# Patient Record
Sex: MALE | Race: WHITE | NOT HISPANIC OR LATINO | Employment: UNEMPLOYED | URBAN - METROPOLITAN AREA
[De-identification: names, ages, dates, MRNs, and addresses within clinical notes are randomized per-mention and may not be internally consistent; named-entity substitution may affect disease eponyms.]

---

## 2020-11-25 ENCOUNTER — COMMUNICATION - HEALTHEAST (OUTPATIENT)
Dept: SCHEDULING | Facility: CLINIC | Age: 40
End: 2020-11-25

## 2020-11-30 ENCOUNTER — OFFICE VISIT - HEALTHEAST (OUTPATIENT)
Dept: FAMILY MEDICINE | Facility: CLINIC | Age: 40
End: 2020-11-30

## 2020-11-30 DIAGNOSIS — I10 HYPERTENSION, ESSENTIAL: ICD-10-CM

## 2020-11-30 LAB
ALBUMIN SERPL-MCNC: 4.5 G/DL (ref 3.5–5)
ALP SERPL-CCNC: 78 U/L (ref 45–120)
ALT SERPL W P-5'-P-CCNC: 26 U/L (ref 0–45)
ANION GAP SERPL CALCULATED.3IONS-SCNC: 10 MMOL/L (ref 5–18)
AST SERPL W P-5'-P-CCNC: 24 U/L (ref 0–40)
BASOPHILS # BLD AUTO: 0 THOU/UL (ref 0–0.2)
BASOPHILS NFR BLD AUTO: 1 % (ref 0–2)
BILIRUB SERPL-MCNC: 1.2 MG/DL (ref 0–1)
BUN SERPL-MCNC: 10 MG/DL (ref 8–22)
CALCIUM SERPL-MCNC: 9.5 MG/DL (ref 8.5–10.5)
CHLORIDE BLD-SCNC: 103 MMOL/L (ref 98–107)
CO2 SERPL-SCNC: 27 MMOL/L (ref 22–31)
CREAT SERPL-MCNC: 0.96 MG/DL (ref 0.7–1.3)
EOSINOPHIL # BLD AUTO: 0.2 THOU/UL (ref 0–0.4)
EOSINOPHIL NFR BLD AUTO: 4 % (ref 0–6)
ERYTHROCYTE [DISTWIDTH] IN BLOOD BY AUTOMATED COUNT: 12.6 % (ref 11–14.5)
GFR SERPL CREATININE-BSD FRML MDRD: >60 ML/MIN/1.73M2
GLUCOSE BLD-MCNC: 98 MG/DL (ref 70–125)
HCT VFR BLD AUTO: 44.6 % (ref 40–54)
HGB BLD-MCNC: 15 G/DL (ref 14–18)
LYMPHOCYTES # BLD AUTO: 2.1 THOU/UL (ref 0.8–4.4)
LYMPHOCYTES NFR BLD AUTO: 34 % (ref 20–40)
MCH RBC QN AUTO: 30.1 PG (ref 27–34)
MCHC RBC AUTO-ENTMCNC: 33.7 G/DL (ref 32–36)
MCV RBC AUTO: 89 FL (ref 80–100)
MONOCYTES # BLD AUTO: 0.4 THOU/UL (ref 0–0.9)
MONOCYTES NFR BLD AUTO: 6 % (ref 2–10)
NEUTROPHILS # BLD AUTO: 3.5 THOU/UL (ref 2–7.7)
NEUTROPHILS NFR BLD AUTO: 56 % (ref 50–70)
PLATELET # BLD AUTO: 223 THOU/UL (ref 140–440)
PMV BLD AUTO: 8.5 FL (ref 7–10)
POTASSIUM BLD-SCNC: 4.8 MMOL/L (ref 3.5–5)
PROT SERPL-MCNC: 7.2 G/DL (ref 6–8)
RBC # BLD AUTO: 5 MILL/UL (ref 4.4–6.2)
SODIUM SERPL-SCNC: 140 MMOL/L (ref 136–145)
TSH SERPL DL<=0.005 MIU/L-ACNC: 1.49 UIU/ML (ref 0.3–5)
WBC: 6.2 THOU/UL (ref 4–11)

## 2020-11-30 ASSESSMENT — MIFFLIN-ST. JEOR: SCORE: 1732.74

## 2020-12-01 ENCOUNTER — COMMUNICATION - HEALTHEAST (OUTPATIENT)
Dept: FAMILY MEDICINE | Facility: CLINIC | Age: 40
End: 2020-12-01

## 2020-12-22 ENCOUNTER — COMMUNICATION - HEALTHEAST (OUTPATIENT)
Dept: FAMILY MEDICINE | Facility: CLINIC | Age: 40
End: 2020-12-22

## 2020-12-22 DIAGNOSIS — I10 HYPERTENSION, ESSENTIAL: ICD-10-CM

## 2021-01-05 ENCOUNTER — OFFICE VISIT - HEALTHEAST (OUTPATIENT)
Dept: FAMILY MEDICINE | Facility: CLINIC | Age: 41
End: 2021-01-05

## 2021-01-05 DIAGNOSIS — L74.0 SWEAT RASH: ICD-10-CM

## 2021-01-05 DIAGNOSIS — F70 MILD INTELLECTUAL DISABILITIES: ICD-10-CM

## 2021-01-05 DIAGNOSIS — L29.9 ITCHY SKIN: ICD-10-CM

## 2021-01-05 DIAGNOSIS — I10 HYPERTENSION, ESSENTIAL: ICD-10-CM

## 2021-01-05 DIAGNOSIS — F31.9 BIPOLAR I DISORDER (H): ICD-10-CM

## 2021-01-05 DIAGNOSIS — J30.2 SEASONAL ALLERGIC RHINITIS, UNSPECIFIED TRIGGER: ICD-10-CM

## 2021-01-05 ASSESSMENT — MIFFLIN-ST. JEOR: SCORE: 1752.24

## 2021-02-08 ENCOUNTER — COMMUNICATION - HEALTHEAST (OUTPATIENT)
Dept: FAMILY MEDICINE | Facility: CLINIC | Age: 41
End: 2021-02-08

## 2021-02-08 DIAGNOSIS — J30.2 SEASONAL ALLERGIC RHINITIS, UNSPECIFIED TRIGGER: ICD-10-CM

## 2021-02-08 RX ORDER — ALBUTEROL SULFATE 90 UG/1
2 AEROSOL, METERED RESPIRATORY (INHALATION) EVERY 6 HOURS PRN
Qty: 1 EACH | Refills: 2 | Status: SHIPPED | OUTPATIENT
Start: 2021-02-08

## 2021-02-25 ENCOUNTER — COMMUNICATION - HEALTHEAST (OUTPATIENT)
Dept: ADMINISTRATIVE | Facility: CLINIC | Age: 41
End: 2021-02-25

## 2021-02-25 DIAGNOSIS — I10 HYPERTENSION, ESSENTIAL: ICD-10-CM

## 2021-02-26 RX ORDER — LISINOPRIL 5 MG/1
5 TABLET ORAL DAILY
Qty: 90 TABLET | Refills: 3 | Status: SHIPPED | OUTPATIENT
Start: 2021-02-26 | End: 2022-05-13

## 2021-06-05 VITALS
HEART RATE: 91 BPM | DIASTOLIC BLOOD PRESSURE: 98 MMHG | HEIGHT: 70 IN | BODY MASS INDEX: 25.61 KG/M2 | TEMPERATURE: 97.8 F | WEIGHT: 178.9 LBS | SYSTOLIC BLOOD PRESSURE: 130 MMHG | OXYGEN SATURATION: 100 %

## 2021-06-05 VITALS
HEIGHT: 71 IN | BODY MASS INDEX: 25.16 KG/M2 | WEIGHT: 179.7 LBS | DIASTOLIC BLOOD PRESSURE: 82 MMHG | HEART RATE: 84 BPM | SYSTOLIC BLOOD PRESSURE: 128 MMHG | TEMPERATURE: 98.2 F

## 2021-06-13 NOTE — PROGRESS NOTES
ASSESSMENT:  1. Hypertension, essential    His blood pressure is not controlled right now, so we will start him on some lisinopril 5 mg a day.  I asked him to take this every day and come back in about 4 weeks to let us know how he is doing with his blood pressures.  If he can, it would be beneficial to have him check it at home or when he is out in the stores.    I think this is probably what is giving him these feelings of being overly warm and flushed with his high blood pressure, but we can see how that goes when we lower his blood pressure with the medication that we started today, but as well we will also check some labs as listed out below and follow-up with him when the results of the become available.      - lisinopriL (PRINIVIL,ZESTRIL) 5 MG tablet; Take 1 tablet (5 mg total) by mouth daily.  Dispense: 30 tablet; Refill: 2  - Thyroid Stimulating Hormone (TSH)  - Comprehensive Metabolic Panel  - HM1(CBC and Differential)          PLAN:  There are no Patient Instructions on file for this visit.    Orders Placed This Encounter   Procedures     Influenza, Seasonal Quad, PF =/> 6months     Thyroid Stimulating Hormone (TSH)     Comprehensive Metabolic Panel     HM1 (CBC with Diff)     Medications Discontinued During This Encounter   Medication Reason     oxyCODONE-acetaminophen (PERCOCET) 5-325 mg per tablet Therapy completed       No follow-ups on file.    CHIEF COMPLAINT:  Chief Complaint   Patient presents with     warm     pt gets really hot, no fever, currently walking around in shorts, no other symptoms, feels perfectly fine, skin does get red when hot and goes away when he cools down, wife thinks it may be an allergic reaction, has been going on for several months, pt is bad at communicating and requesting wife to join him for visit       HISTORY OF PRESENT ILLNESS:  Shashi is a 40 y.o. male presenting to the clinic today for issues related to feelings of being warm all the time and of some flushing that  "he gets.He is a new patient to our clinic.  We reviewed his history he does have a history of essential hypertension but is currently not on any medications for this.  He does not know if his blood pressures have been elevated as he does not check them at home.  He just feels warm a lot of the time and walks around with shorts on when the radiologist is addressed a little more warmly.  He does not have a lot of sweating from this.  It seems that his skin gets a little red as well.  This is been going on for several months.  The patient has some mild little actual disabilities and it is difficult for him to communicate exactly what is going on with his family.        REVIEW OF SYSTEMS:     All other systems are negative.    PFSH:    Reviewed    Patient is new patient to the clinic. Please see past medical history, surgical history, social history and family history, all of which were completed in their entirety today.     TOBACCO USE:  Social History     Tobacco Use   Smoking Status Never Smoker   Smokeless Tobacco Never Used       VITALS:  Vitals:    11/30/20 1650   BP: (!) 130/98   Patient Site: Left Arm   Patient Position: Sitting   Cuff Size: Adult Regular   Pulse: 91   Temp: 97.8  F (36.6  C)   SpO2: 100%   Weight: 178 lb 14.4 oz (81.1 kg)   Height: 5' 10\" (1.778 m)     Wt Readings from Last 3 Encounters:   11/30/20 178 lb 14.4 oz (81.1 kg)   09/22/14 160 lb (72.6 kg)     Body mass index is 25.67 kg/m .    PHYSICAL EXAM:  Constitutional:  Well appearing patient in no acute distress.  Vitals:  Per nursing notes.    HEENT:  Normocephalic, atraumatic.  Ears are clear bilaterally, with no fluid or redness, and landmarks visible.  Pupils are equal and reactive to light, extraocular muscles intact, visual fields are full.  Nose is normal, and oropharynx is clear without redness.    Neck is without lymphadenopathy.    Lungs:  Clear to auscultation bilaterally without wheezes, rales or rhonchi.   Cardiac:  Regular rate " and rhythm without murmurs, rubs, or gallops.     Legs show no cyanosis, clubbing or edema.  Palpation of the distal pulses are normal and symmetric.    Neurologic:  Cranial nerves II-XII intact.   Normal and symmetric reflexes in extremities, with normal strength and sensation.  Psychiatric:  Mood appropriate, memory intact.             MEDICATIONS:  Current Outpatient Medications   Medication Sig Dispense Refill     multivitamin (MULTIPLE VITAMINS ORAL) Take 1 tablet by mouth.       lisinopriL (PRINIVIL,ZESTRIL) 5 MG tablet Take 1 tablet (5 mg total) by mouth daily. 30 tablet 2     No current facility-administered medications for this visit.

## 2021-06-13 NOTE — TELEPHONE ENCOUNTER
I left message for Shashi and his wife that they both can attend his appointment.     Magda Pineda, A

## 2021-06-13 NOTE — TELEPHONE ENCOUNTER
Who is calling:  Pt and wife  Reason for Call:  Pt has scheduled and apt for 11.30.20 and is requesting to have his wife join him for his visit, he has trouble communicating has symptoms and wife is concern pt will not communicate with provider   Date of last appointment with primary care: na  Okay to leave a detailed message: Yes

## 2021-06-14 NOTE — PROGRESS NOTES
ASSESSMENT:  1. Sweat rash    2. Itchy skin    3. Hypertension, essential    4. Bipolar I disorder (H)  - AMB REFERRAL TO MENTAL HEALTH AND ADDICTION  - Adult (18+); Assessment and Testing; /CD Assessment Center - Assess & Treat; Mental Health Evaluation - determine appropriate level of care and admit to program; [facility] : [phone]; We ...    5. Mild intellectual disabilities  - AMB REFERRAL TO MENTAL HEALTH AND ADDICTION  - Adult (18+); Assessment and Testing; /CD Assessment Center - Assess & Treat; Mental Health Evaluation - determine appropriate level of care and admit to program; [facility] : [phone]; We ...    6. Seasonal allergic rhinitis, unspecified trigger  - albuterol (PROAIR HFA;PROVENTIL HFA;VENTOLIN HFA) 90 mcg/actuation inhaler; Inhale 2 puffs every 6 (six) hours as needed for wheezing.  Dispense: 1 each; Refill: 0        PLAN:  As noted he stated that his diagnosis is wrong regarding ADHD and about mental problems.  I did a review his care everywhere note that did show that he has a history of bipolar disorder, and has been on aripiprazole as well as trazodone.  He noted that this medication has been weaned off and he is no longer taking the medicine.  He does not take any medication at this point.  Also regarding that I am not sure as to the correct information.  I encouraged him to be seen by a psychiatrist or psychologist with the intention of doing a psychological screening to determine what he needs and what diagnosis he has.  Regarding the itching I explained to him that it is possible that it is part of the previous medication he has been taking which is the aripiprazole and possibly the try season.  I did put in the referral and hope that he will be assessed and tested for and management restarted if needed.    Orders Placed This Encounter   Procedures     AMB REFERRAL TO MENTAL HEALTH AND ADDICTION  - Adult (18+); Assessment and Testing; Neuropsychological Testing;  [facility] [phone]; We will contact you to schedule the appointment or please call with any questions     Referral Priority:   Routine     Referral Type:   Behavioral Health     Referral Reason:   Evaluation and Treatment     Requested Specialty:   Behavioral Health     Number of Visits Requested:   1     There are no discontinued medications.    No follow-ups on file.      CHIEF COMPLAINT:  Chief Complaint   Patient presents with     Follow-up     following up on lisinopril prescription from  11/30, pt states skin feels hot and itchy but has been experiencing this before starting medication        HISTORY OF PRESENT ILLNESS:  Shashi is a 40 y.o. male presenting to the clinic today scheduled for a follow-up visit for his concern of sweatiness with itchiness of the skin.  But he comes in thinking about psychiatric issues.  He noted having had a diagnosis of ADHD and other psychiatric diagnosis which he thinks is wrong diagnosis.  He wanted to have the wrong diagnosis taking out from his medical history.  And noted that he intermittently will have a sweatiness when he is hyperactive.  He also wanted to have a refill of his albuterol which he noted that he has used in the past he noted having a history of seasonal allergic rhinitis.      REVIEW OF SYSTEMS:   He noted that he does not have any problems at this time.  He does not have any suicidal ideation and does not feel that he is depressed.  He noted that he uses sleep aid to help him sleep.  But otherwise he feels well.  No chest pain no shortness of breath.  Intermittently will have some itching rash/bumps but not now.  Noted that his itching is intermittent.  All other systems are negative.    PFSH:  Reviewed, as below.    Social History     Tobacco Use   Smoking Status Never Smoker   Smokeless Tobacco Never Used       No family history on file.    Social History     Socioeconomic History     Marital status: Single     Spouse name:  Not on file     Number of children: Not on file     Years of education: Not on file     Highest education level: Not on file   Occupational History     Not on file   Social Needs     Financial resource strain: Not on file     Food insecurity     Worry: Not on file     Inability: Not on file     Transportation needs     Medical: Not on file     Non-medical: Not on file   Tobacco Use     Smoking status: Never Smoker     Smokeless tobacco: Never Used   Substance and Sexual Activity     Alcohol use: Never     Frequency: Never     Drug use: Never     Sexual activity: Not on file   Lifestyle     Physical activity     Days per week: Not on file     Minutes per session: Not on file     Stress: Not on file   Relationships     Social connections     Talks on phone: Not on file     Gets together: Not on file     Attends Buddhist service: Not on file     Active member of club or organization: Not on file     Attends meetings of clubs or organizations: Not on file     Relationship status: Not on file     Intimate partner violence     Fear of current or ex partner: Not on file     Emotionally abused: Not on file     Physically abused: Not on file     Forced sexual activity: Not on file   Other Topics Concern     Not on file   Social History Narrative     Not on file       Past Surgical History:   Procedure Laterality Date     GASTRIC BYPASS         Allergies   Allergen Reactions     Mold      Other reaction(s): Unknown     Other Environmental Allergy      Other reaction(s): Unknown  PT IS ALLERGIC TO GRASS       Active Ambulatory Problems     Diagnosis Date Noted     Hypertension, essential 04/29/2013     Hyperlipidemia 11/15/2013     Bipolar I disorder (H) 01/28/2003     Mild intellectual disabilities 11/30/2020     Resolved Ambulatory Problems     Diagnosis Date Noted     No Resolved Ambulatory Problems     Past Medical History:   Diagnosis Date     Hypertension        Current Outpatient Medications   Medication Sig Dispense  "Refill     lisinopriL (PRINIVIL,ZESTRIL) 5 MG tablet Take 1 tablet (5 mg total) by mouth daily. 90 tablet 3     multivitamin (MULTIPLE VITAMINS ORAL) Take 1 tablet by mouth.       albuterol (PROAIR HFA;PROVENTIL HFA;VENTOLIN HFA) 90 mcg/actuation inhaler Inhale 2 puffs every 6 (six) hours as needed for wheezing. 1 each 0     No current facility-administered medications for this visit.        VITALS:  Vitals:    01/05/21 1035   BP: 128/82   Patient Site: Left Arm   Patient Position: Sitting   Cuff Size: Adult Regular   Pulse: 84   Temp: 98.2  F (36.8  C)   TempSrc: Oral   Weight: 179 lb 11.2 oz (81.5 kg)   Height: 5' 11\" (1.803 m)     Wt Readings from Last 3 Encounters:   01/05/21 179 lb 11.2 oz (81.5 kg)   11/30/20 178 lb 14.4 oz (81.1 kg)   09/22/14 160 lb (72.6 kg)     Body mass index is 25.06 kg/m .    PHYSICAL EXAM:  General Appearance: Alert, cooperative, no distress, appears stated age. Good eye contact.  HEENT: Pupils are equal and reactive, extraocular motions is normal. Neck is supple no notable thyromegaly.  External ears are normal.  Lungs: Clear to auscultation bilaterally, respirations unlabored  Heart: Regular rhythm and normal rate,S1 and S2 normal,   Abdomen: Soft  Musculoskeletal: Normal range of motion. No joint swelling or deformity.   Neurologic:  Alert and oriented times 3. Normal reflexes. Cranial nerves II-XII intact.   Psychiatric: He does appear to be in a normal mood.  MEDICATIONS:  Current Outpatient Medications   Medication Sig Dispense Refill     lisinopriL (PRINIVIL,ZESTRIL) 5 MG tablet Take 1 tablet (5 mg total) by mouth daily. 90 tablet 3     multivitamin (MULTIPLE VITAMINS ORAL) Take 1 tablet by mouth.       albuterol (PROAIR HFA;PROVENTIL HFA;VENTOLIN HFA) 90 mcg/actuation inhaler Inhale 2 puffs every 6 (six) hours as needed for wheezing. 1 each 0     No current facility-administered medications for this visit.                  "

## 2021-06-15 NOTE — TELEPHONE ENCOUNTER
Refill request for medication: Albuterol HFA INH  Last visit addressing this medication: 1/5/21  Follow up plan No plan  months  Last refill on 1/5/21, quantity 1   CSA completed N/A   checked  02/08/21, last dispensed refill N/A    Appointment: Not due     Martha Ibarra [facility]

## 2021-06-16 PROBLEM — F70 MILD INTELLECTUAL DISABILITIES: Status: ACTIVE | Noted: 2020-11-30

## 2021-06-21 NOTE — LETTER
Letter by Kevin Gold MD at      Author: Kevin Gold MD Service: -- Author Type: --    Filed:  Encounter Date: 12/1/2020 Status: (Other)         Shashi Lopez  [address] [facility] [address]             December 1, 2020         Dear Chiquis John,    Below are the results from your recent visit:    Resulted Orders   Thyroid Stimulating Hormone (TSH)   Result Value Ref Range    TSH 1.49 0.30 - 5.00 uIU/mL   Comprehensive Metabolic Panel   Result Value Ref Range    Sodium 140 136 - 145 mmol/L    Potassium 4.8 3.5 - 5.0 mmol/L    Chloride 103 98 - 107 mmol/L    CO2 27 22 - 31 mmol/L    Anion Gap, Calculation 10 5 - 18 mmol/L    Glucose 98 70 - 125 mg/dL    BUN 10 8 - 22 mg/dL    Creatinine 0.96 0.70 - 1.30 mg/dL    GFR MDRD Af Amer >60 >60 mL/min/1.73m2    GFR MDRD Non Af Amer >60 >60 mL/min/1.73m2    Bilirubin, Total 1.2 (H) 0.0 - 1.0 mg/dL    Calcium 9.5 8.5 - 10.5 mg/dL    Protein, Total 7.2 6.0 - 8.0 g/dL    Albumin 4.5 3.5 - 5.0 g/dL    Alkaline Phosphatase 78 45 - 120 U/L    AST 24 0 - 40 U/L    ALT 26 0 - 45 U/L    Narrative    Fasting Glucose reference range is 70-99 mg/dL per  American Diabetes Association (ADA) guidelines.   HM1 (CBC with Diff)   Result Value Ref Range    WBC 6.2 4.0 - 11.0 thou/uL    RBC 5.00 4.40 - 6.20 mill/uL    Hemoglobin 15.0 14.0 - 18.0 g/dL    Hematocrit 44.6 40.0 - 54.0 %    MCV 89 80 - 100 fL    MCH 30.1 27.0 - 34.0 pg    MCHC 33.7 32.0 - 36.0 g/dL    RDW 12.6 11.0 - 14.5 %    Platelets 223 140 - 440 thou/uL    MPV 8.5 7.0 - 10.0 fL    Neutrophils % 56 50 - 70 %    Lymphocytes % 34 20 - 40 %    Monocytes % 6 2 - 10 %    Eosinophils % 4 0 - 6 %    Basophils % 1 0 - 2 %    Neutrophils Absolute 3.5 2.0 - 7.7 thou/uL    Lymphocytes Absolute 2.1 0.8 - 4.4 thou/uL    Monocytes Absolute 0.4 0.0 - 0.9 thou/uL    Eosinophils Absolute 0.2 0.0 - 0.4 thou/uL    Basophils Absolute 0.0 0.0 - 0.2 thou/uL        Thyroid test is ok.      Your complete metabolic panel is good.  Your blood  sugars are normal, and your kidney function tests and liver  function tests are in a normal range.      Blood counts are fine.     Please call with questions or contact us using [employer].    Sincerely,        Electronically signed by Kevin Gold MD

## 2021-11-07 ENCOUNTER — APPOINTMENT (OUTPATIENT)
Dept: RADIOLOGY | Facility: CLINIC | Age: 41
End: 2021-11-07
Attending: EMERGENCY MEDICINE
Payer: COMMERCIAL

## 2021-11-07 ENCOUNTER — HOSPITAL ENCOUNTER (EMERGENCY)
Facility: CLINIC | Age: 41
Discharge: HOME | End: 2021-11-07
Admitting: NURSE PRACTITIONER
Payer: COMMERCIAL

## 2021-11-07 VITALS
BODY MASS INDEX: 25.66 KG/M2 | RESPIRATION RATE: 16 BRPM | TEMPERATURE: 98.1 F | OXYGEN SATURATION: 97 % | WEIGHT: 184 LBS | HEART RATE: 81 BPM

## 2021-11-07 DIAGNOSIS — S60.222A CONTUSION OF LEFT HAND, INITIAL ENCOUNTER: ICD-10-CM

## 2021-11-07 PROCEDURE — 99283 EMERGENCY DEPT VISIT LOW MDM: CPT

## 2021-11-07 PROCEDURE — 73130 X-RAY EXAM OF HAND: CPT | Mod: LT

## 2021-11-07 ASSESSMENT — ENCOUNTER SYMPTOMS
WEAKNESS: 0
ARTHRALGIAS: 1
NUMBNESS: 0

## 2021-11-07 NOTE — ED PROVIDER NOTES
EMERGENCY DEPARTMENT ENCOUNTER      NAME: Shashi Lopez  AGE: 41 year old male  YOB: 1980  MRN: [id number]  EVALUATION DATE & TIME: 2021  4:16 PM    PCP: No primary care provider on file.    ED PROVIDER: NIKITA Rivas, CNP      Chief Complaint   Patient presents with     Hand Pain         FINAL IMPRESSION:  1. Contusion of left hand, initial encounter          ED COURSE & MEDICAL DECISION MAKIN:19 PM I met with the patient, obtained history, performed an initial exam, and discussed options and plan for treatment here in the ED.    Pertinent Labs & Imaging studies reviewed. (See chart for details)  41 year old male presents to the Emergency Department for evaluation of tendon injury.  Exam consistent with contusion and x-ray does not show any underlying fracture.  Low suspicion for any occult fracture at this point.  Ace wrap was applied to the hand.  He was given instructions regarding conservative management including elevation, ice, and NSAIDs.  Recommend clinic follow-up in 1 week with recheck.  Was also given return precautions    At the conclusion of the encounter I discussed the results of all of the tests and the disposition. The questions were answered. The patient or family acknowledged understanding and was agreeable with the care plan.       MEDICATIONS GIVEN IN THE EMERGENCY:  Medications - No data to display    NEW PRESCRIPTIONS STARTED AT TODAY'S ER VISIT  New Prescriptions    No medications on file            =================================================================    HPI    Patient information was obtained from: patient    Use of Intrepreter: N/A      Shashi Lopez is a 41 year old male with a history of hypertension and asthma who presents with hand pain.  Patient reports that a ladder fell on his hand 5 minutes prior to arrival.  He notes tenderness and swelling on the dorsum of the hand.  Denies any weakness, numbness, other injuries or  concerns.        REVIEW OF SYSTEMS   Review of Systems   Musculoskeletal: Positive for arthralgias.   Neurological: Negative for weakness and numbness.        PAST MEDICAL HISTORY:  Past Medical History:   Diagnosis Date     Hypertension        PAST SURGICAL HISTORY:  Past Surgical History:   Procedure Laterality Date     GASTRIC BYPASS             CURRENT MEDICATIONS:    Prior to Admission Medications   Prescriptions Last Dose Informant Patient Reported? Taking?   albuterol (PROAIR HFA;PROVENTIL HFA;VENTOLIN HFA) 90 mcg/actuation inhaler   No No   Sig: [ALBUTEROL (PROAIR HFA;PROVENTIL HFA;VENTOLIN HFA) 90 MCG/ACTUATION INHALER] Inhale 2 puffs every 6 (six) hours as needed for wheezing.   lisinopriL (PRINIVIL,ZESTRIL) 5 MG tablet   No No   Sig: [LISINOPRIL (PRINIVIL,ZESTRIL) 5 MG TABLET] Take 1 tablet (5 mg total) by mouth daily.   multivitamin (MULTIPLE VITAMINS ORAL)   Yes No   Sig: [MULTIVITAMIN (MULTIPLE VITAMINS ORAL)] Take 1 tablet by mouth.      Facility-Administered Medications: None           ALLERGIES:  Allergies   Allergen Reactions     Mold [Molds & Smuts] Unknown     Other reaction(s): Unknown     Other Environmental Allergy Unknown     Other reaction(s): Unknown, PT IS ALLERGIC TO GRASS       FAMILY HISTORY:  No family history on file.    SOCIAL HISTORY:   Social History     Socioeconomic History     Marital status: Single     Spouse name: Not on file     Number of children: Not on file     Years of education: Not on file     Highest education level: Not on file   Occupational History     Not on file   Tobacco Use     Smoking status: Never Smoker     Smokeless tobacco: Never Used   Substance and Sexual Activity     Alcohol use: Never     Drug use: Never     Sexual activity: Not on file   Other Topics Concern     Not on file   Social History Narrative     Not on file     Social Determinants of Health     Financial Resource Strain: Not on file   Food Insecurity: Not on file   Transportation Needs: Not on  file   Physical Activity: Not on file   Stress: Not on file   Social Connections: Not on file   Intimate Partner Violence: Not on file   Housing Stability: Not on file         VITALS:  Patient Vitals for the past 24 hrs:   Temp Pulse Resp SpO2 Weight   11/07/21 1508 98.1  F (36.7  C) 81 16 97 % 83.5 kg (184 lb)       PHYSICAL EXAM    Constitutional:  Well developed, well nourished, no acute distress  Respiratory:  No respiratory distress  Musculoskeletal:  Able to move all digits. All 3 layers of tendon function intact. Intrinsic muscle function of the left thumb intact. Motor and sensory function in the radial, medial, and ulnar distributions intact.  Focal area of swelling in the dorsum of the left hand consistent with contusion  Integument: Warm, Dry, intact at the site of injury  Neurologic:  Alert & oriented x 3     LAB:  All pertinent labs reviewed and interpreted.  Results for orders placed or performed during the hospital encounter of 11/07/21   XR Hand Left G/E 3 Views    Impression    IMPRESSION: Normal joint spaces and alignment. No fracture.       RADIOLOGY:  Reviewed all pertinent imaging. Please see official radiology report.  XR Hand Left G/E 3 Views   Final Result   IMPRESSION: Normal joint spaces and alignment. No fracture.            PROCEDURES:   None      I, Tova Nielsen, am serving as a scribe to document services personally performed by Estuardo Sanabria CNP. based on my observation and the provider's statements to me. IEstuardo CNP attest that Tova Nielsen is acting in a scribe capacity, has observed my performance of the services and has documented them in accordance with my direction.    NIKITA Rivas, CNP  Emergency Medicine  [facility] EMERGENCY ROOM  [address] [facility] [address]  [phone]  Dept: [phone]         Estuardo Sanabria APRN CNP  11/07/21 1798

## 2021-11-07 NOTE — DISCHARGE INSTRUCTIONS
A contusion to your hand is thecause of your pain and swelling today.  Your Xrays do not show a broken bone.    For your comfort, please use the ace wrap for one week  Keep the area elevated and ice the 5 times for 20 minutes each time today     Follow up with your doctor in one week for a recheck.  You should take ibuprofen, 600mg, threetimes per day as needed for pain.  You can also use acetaminophen, 650 mg, up to four times per day as needed for pain.  You can use these medications together, there are no concerning interactions.      _______________________________________________________________

## 2022-05-13 DIAGNOSIS — I10 HYPERTENSION, ESSENTIAL: ICD-10-CM

## 2022-05-13 RX ORDER — LISINOPRIL 5 MG/1
5 TABLET ORAL DAILY
Qty: 6 TABLET | Refills: 0 | Status: SHIPPED | OUTPATIENT
Start: 2022-05-13 | End: 2022-05-17

## 2022-05-13 NOTE — TELEPHONE ENCOUNTER
Patient has been out of his Lisinopril for a couple of days now and needs refill for 6 days until his appointment on 5/18/22 with Dr. Crowe for blood pressure check.     Rx pended Lisinopril 5 mg #6  Last refill: 2/26/21 #90 with 3 refills  Last ov with Dr. Avalos: 1/5/21

## 2022-05-16 DIAGNOSIS — I10 HYPERTENSION, ESSENTIAL: ICD-10-CM

## 2022-05-17 RX ORDER — LISINOPRIL 5 MG/1
TABLET ORAL
Qty: 30 TABLET | Refills: 0 | Status: SHIPPED | OUTPATIENT
Start: 2022-05-17 | End: 2022-05-18

## 2022-05-17 NOTE — TELEPHONE ENCOUNTER
"Routing refill request to provider for review/approval because:  BP and labs (creatinine and potassium) overdue.  Overdue for office visit.    Last Written Prescription Date:  5/13/22  Last Fill Quantity: 6,  # refills: 0   Last office visit provider:  1/5/22    Requested Prescriptions   Pending Prescriptions Disp Refills     lisinopril (ZESTRIL) 5 MG tablet [Pharmacy Med Name: LISINOPRIL 5MG TABLETS] 6 tablet 0     Sig: TAKE 1 TABLET(5 MG) BY MOUTH DAILY       ACE Inhibitors (Including Combos) Protocol Failed - 5/16/2022  3:36 AM        Failed - Blood pressure under 140/90 in past 12 months     BP Readings from Last 3 Encounters:   01/05/21 128/82   11/30/20 (!) 130/98                 Failed - Normal serum creatinine on file in past 12 months     Recent Labs   Lab Test 11/30/20  1711   CR 0.96       Ok to refill medication if creatinine is low          Failed - Normal serum potassium on file in past 12 months     Recent Labs   Lab Test 11/30/20  1711   POTASSIUM 4.8             Passed - Recent (12 mo) or future (30 days) visit within the authorizing provider's specialty     Patient has had an office visit with the authorizing provider or a provider within the authorizing providers department within the previous 12 mos or has a future within next 30 days. See \"Patient Info\" tab in inbasket, or \"Choose Columns\" in Meds & Orders section of the refill encounter.              Passed - Medication is active on med list        Passed - Patient is age 18 or older             Magda Baldwin RN 05/17/22 12:13 AM  "

## 2022-05-18 ENCOUNTER — OFFICE VISIT (OUTPATIENT)
Dept: FAMILY MEDICINE | Facility: CLINIC | Age: 42
End: 2022-05-18
Payer: COMMERCIAL

## 2022-05-18 VITALS
SYSTOLIC BLOOD PRESSURE: 136 MMHG | BODY MASS INDEX: 24.06 KG/M2 | DIASTOLIC BLOOD PRESSURE: 80 MMHG | WEIGHT: 172.5 LBS | HEART RATE: 100 BPM

## 2022-05-18 DIAGNOSIS — I10 PRIMARY HYPERTENSION: ICD-10-CM

## 2022-05-18 DIAGNOSIS — J45.990 EXERCISE-INDUCED ASTHMA: Primary | ICD-10-CM

## 2022-05-18 PROCEDURE — 99214 OFFICE O/P EST MOD 30 MIN: CPT | Performed by: FAMILY MEDICINE

## 2022-05-18 RX ORDER — BUPROPION HYDROCHLORIDE 150 MG/1
150 TABLET ORAL EVERY MORNING
COMMUNITY
Start: 2022-05-10

## 2022-05-18 RX ORDER — TRAZODONE HYDROCHLORIDE 50 MG/1
50 TABLET, FILM COATED ORAL
COMMUNITY
Start: 2022-04-03

## 2022-05-18 RX ORDER — LISINOPRIL 5 MG/1
TABLET ORAL
Qty: 90 TABLET | Refills: 1 | Status: SHIPPED | OUTPATIENT
Start: 2022-05-18 | End: 2022-10-25

## 2022-05-18 RX ORDER — FLUTICASONE PROPIONATE 110 UG/1
AEROSOL, METERED RESPIRATORY (INHALATION)
Qty: 12 G | Refills: 1 | Status: SHIPPED | OUTPATIENT
Start: 2022-05-18 | End: 2023-02-17

## 2022-05-18 RX ORDER — LORAZEPAM 1 MG/1
1 TABLET ORAL PRN
COMMUNITY
Start: 2022-04-07

## 2022-05-18 NOTE — PROGRESS NOTES
ASSESSMENT/PLAN:  Shashi was seen today for blood pressure check and medication request.    Diagnoses and all orders for this visit:    Exercise-induced asthma  -     fluticasone (FLOVENT HFA) 110 MCG/ACT inhaler; 2 puffs 30 minutes prior to running  Try Flovent.  Follow-up with primary care provider in 3 months for med check    Primary hypertension  -     lisinopril (ZESTRIL) 5 MG tablet; TAKE 1 TABLET(5 MG) BY MOUTH DAILY  Stable.  Refilled.  Follow-up every 6 to 12 months for blood pressure check with his primary care provider    SUBJECTIVE:    Shashi Lopez is a 41 year old male who came in today     He has hypertension on lisinopril 5 mg.  Has been taking this medication for the last 2 years.  Blood pressure is under good control today.  He is asking for refills    Also has exercise-induced asthma.  He has had asthma for many years.  The asthma is triggered by running.  His symptoms are that of wheezing with running.  Albuterol which initially was helpful is not helping anymore.  He does not have any shortness of breath, wheezing, coughing at rest.    Review of Systems (except those mentioned above)  Constitutional: Negative.   HENT: Negative.   Eyes: Negative.   Respiratory: Negative.   Cardiovascular: Negative.   Gastrointestinal: Negative.   Endocrine: Negative.   Genitourinary: Negative.   Musculoskeletal: Negative.   Skin: Negative.   Allergic/Immunologic: Negative.   Neurological: Negative.   Hematological: Negative.   Psychiatric/Behavioral: Negative.     Patient Active Problem List    Diagnosis Date Noted     Mild intellectual disabilities 11/30/2020     Priority: Medium     Hyperlipidemia 11/15/2013     Priority: Medium     Hypertension, essential 04/29/2013     Priority: Medium     4/2012         Bipolar I disorder (H) 01/28/2003     Priority: Medium     Allergies   Allergen Reactions     Mold [Molds & Smuts] Unknown     Other reaction(s): Unknown     Other Environmental Allergy Unknown     Other  reaction(s): Unknown, PT IS ALLERGIC TO GRASS     Current Outpatient Medications   Medication Sig Dispense Refill     albuterol (PROAIR HFA;PROVENTIL HFA;VENTOLIN HFA) 90 mcg/actuation inhaler [ALBUTEROL (PROAIR HFA;PROVENTIL HFA;VENTOLIN HFA) 90 MCG/ACTUATION INHALER] Inhale 2 puffs every 6 (six) hours as needed for wheezing. 1 each 2     buPROPion (WELLBUTRIN XL) 150 MG 24 hr tablet        fluticasone (FLOVENT HFA) 110 MCG/ACT inhaler 2 puffs 30 minutes prior to running 12 g 1     lisinopril (ZESTRIL) 5 MG tablet TAKE 1 TABLET(5 MG) BY MOUTH DAILY 90 tablet 1     LORazepam (ATIVAN) 1 MG tablet        multivitamin (MULTIPLE VITAMINS ORAL) [MULTIVITAMIN (MULTIPLE VITAMINS ORAL)] Take 1 tablet by mouth.       traZODone (DESYREL) 50 MG tablet        Past Medical History:   Diagnosis Date     Hypertension      Past Surgical History:   Procedure Laterality Date     GASTRIC BYPASS       Social History     Socioeconomic History     Marital status: Single     Spouse name: None     Number of children: None     Years of education: None     Highest education level: None   Tobacco Use     Smoking status: Never Smoker     Smokeless tobacco: Never Used   Substance and Sexual Activity     Alcohol use: Never     Drug use: Never     No family history on file.      OBJECTIVE:    Vitals:    05/18/22 1207   BP: 136/80   Pulse: 100   Weight: 78.2 kg (172 lb 8 oz)     Body mass index is 24.06 kg/m .    Physical Exam:  Constitutional: Patient is oriented to person, place, and time. Patient appears well-developed and well-nourished. No distress.   Head: Normocephalic and atraumatic.   Right Ear: External ear normal.   Left Ear: External ear normal.   Eyes: Conjunctivae and EOM are normal. Right eye exhibits no discharge. Left eye exhibits no discharge. No scleral icterus.   Neurological: Patient is alert and oriented to person, place, and time.  Skin: No rash noted. Patient is not diaphoretic. No erythema. No pallor.

## 2022-05-19 ENCOUNTER — TELEPHONE (OUTPATIENT)
Dept: FAMILY MEDICINE | Facility: CLINIC | Age: 42
End: 2022-05-19
Payer: COMMERCIAL

## 2022-05-19 DIAGNOSIS — J45.990 EXERCISE-INDUCED ASTHMA: Primary | ICD-10-CM

## 2022-05-19 NOTE — TELEPHONE ENCOUNTER
[facility] fax a drug change request for:  Flovent  mcg oral inh    Message:  Drug not covered by patient plan. The preferred alternative is:    Qvar Redihaler, Asmanex HFA, Asmanex     Please send new rx to change medication along with strength, directions, quantity and refills.

## 2022-08-12 ENCOUNTER — TELEPHONE (OUTPATIENT)
Dept: FAMILY MEDICINE | Facility: CLINIC | Age: 42
End: 2022-08-12

## 2022-08-12 NOTE — TELEPHONE ENCOUNTER
Pt returning call that he received around 9/10am.  No name was left of who LVM for him; so he was returning call and also stated that he did not appreciate the way whomever left the message.  He also stated to have not been sent a letter or notified that Dr. Avalos was leaving.

## 2022-10-19 DIAGNOSIS — I10 PRIMARY HYPERTENSION: ICD-10-CM

## 2022-10-25 RX ORDER — LISINOPRIL 5 MG/1
TABLET ORAL
Qty: 90 TABLET | Refills: 0 | Status: SHIPPED | OUTPATIENT
Start: 2022-10-25 | End: 2023-01-26

## 2023-01-25 DIAGNOSIS — I10 PRIMARY HYPERTENSION: ICD-10-CM

## 2023-01-26 RX ORDER — LISINOPRIL 5 MG/1
5 TABLET ORAL DAILY
Qty: 90 TABLET | Refills: 0 | Status: SHIPPED | OUTPATIENT
Start: 2023-01-26 | End: 2023-02-17

## 2023-01-26 NOTE — TELEPHONE ENCOUNTER
Routing refill request to provider for review/approval because:  LOV 5/18/20222    Has upcoming appt with Braxton in 3 weeks     BRANNON Navarrete  [facility]

## 2023-02-17 ENCOUNTER — OFFICE VISIT (OUTPATIENT)
Dept: FAMILY MEDICINE | Facility: CLINIC | Age: 43
End: 2023-02-17
Payer: COMMERCIAL

## 2023-02-17 VITALS
SYSTOLIC BLOOD PRESSURE: 122 MMHG | BODY MASS INDEX: 24.78 KG/M2 | WEIGHT: 167.3 LBS | HEIGHT: 69 IN | HEART RATE: 80 BPM | DIASTOLIC BLOOD PRESSURE: 76 MMHG

## 2023-02-17 DIAGNOSIS — I10 HYPERTENSION, ESSENTIAL: ICD-10-CM

## 2023-02-17 DIAGNOSIS — J30.9 ALLERGIC RHINITIS, UNSPECIFIED SEASONALITY, UNSPECIFIED TRIGGER: ICD-10-CM

## 2023-02-17 DIAGNOSIS — J45.990 EXERCISE-INDUCED ASTHMA: Primary | ICD-10-CM

## 2023-02-17 DIAGNOSIS — Z86.59 H/O ATTENTION DEFICIT HYPERACTIVITY DISORDER: ICD-10-CM

## 2023-02-17 PROBLEM — F84.5 ASPERGER'S SYNDROME: Status: ACTIVE | Noted: 2022-05-18

## 2023-02-17 PROCEDURE — 99214 OFFICE O/P EST MOD 30 MIN: CPT | Mod: 25 | Performed by: NURSE PRACTITIONER

## 2023-02-17 PROCEDURE — 90715 TDAP VACCINE 7 YRS/> IM: CPT | Performed by: NURSE PRACTITIONER

## 2023-02-17 PROCEDURE — 90471 IMMUNIZATION ADMIN: CPT | Performed by: NURSE PRACTITIONER

## 2023-02-17 RX ORDER — GUANFACINE 1 MG/1
1 TABLET ORAL AT BEDTIME
COMMUNITY
Start: 2023-02-08

## 2023-02-17 ASSESSMENT — ASTHMA QUESTIONNAIRES
QUESTION_5 LAST FOUR WEEKS HOW WOULD YOU RATE YOUR ASTHMA CONTROL: WELL CONTROLLED
QUESTION_3 LAST FOUR WEEKS HOW OFTEN DID YOUR ASTHMA SYMPTOMS (WHEEZING, COUGHING, SHORTNESS OF BREATH, CHEST TIGHTNESS OR PAIN) WAKE YOU UP AT NIGHT OR EARLIER THAN USUAL IN THE MORNING: NOT AT ALL
QUESTION_4 LAST FOUR WEEKS HOW OFTEN HAVE YOU USED YOUR RESCUE INHALER OR NEBULIZER MEDICATION (SUCH AS ALBUTEROL): ONCE A WEEK OR LESS
ACT_TOTALSCORE: 23
QUESTION_2 LAST FOUR WEEKS HOW OFTEN HAVE YOU HAD SHORTNESS OF BREATH: NOT AT ALL
QUESTION_1 LAST FOUR WEEKS HOW MUCH OF THE TIME DID YOUR ASTHMA KEEP YOU FROM GETTING AS MUCH DONE AT WORK, SCHOOL OR AT HOME: NONE OF THE TIME
ACT_TOTALSCORE: 23

## 2023-02-17 NOTE — PROGRESS NOTES
"  Assessment & Plan     Exercise-induced asthma  - ACT normal, stable.     H/O attention deficit hyperactivity disorder  - can continue psychiatry.     Allergic rhinitis, unspecified seasonality, unspecified trigger  - stable.     Hypertension  - he can stop 5 mg lisinopril and he can recheck BP in two weeks with nurse.                    No follow-ups on file.    NIKITA Ugarte CNP  [facility] LATISHA Danielle is a 42 year old, presenting for the following health issues:  Med Check (Wanting to stop taking lisinopril)    -He is present today with a desire to stop his lisinopril.  He stated that his blood pressure has been stable and he does not believe he needs it anymore.  He would like to see if he can stop it.   -He has been seeing psychiatry.    HPI           Review of Systems   All other systems reviewed and are negative.           Objective    /76   Pulse 80   Ht 1.756 m (5' 9.13\")   Wt 75.9 kg (167 lb 4.8 oz)   BMI 24.61 kg/m    Body mass index is 24.61 kg/m .  Physical Exam   GENERAL: healthy, alert and no distress  NECK: no adenopathy, no asymmetry, masses, or scars and thyroid normal to palpation  RESP: lungs clear to auscultation - no rales, rhonchi or wheezes  CV: regular rate and rhythm, normal S1 S2, no S3 or S4, no murmur, click or rub, no peripheral edema and peripheral pulses strong  ABDOMEN: soft, nontender, no hepatosplenomegaly, no masses and bowel sounds normal  MS: no gross musculoskeletal defects noted, no edema                    "

## 2023-07-11 ENCOUNTER — OFFICE VISIT (OUTPATIENT)
Dept: FAMILY MEDICINE | Facility: CLINIC | Age: 43
End: 2023-07-11
Payer: COMMERCIAL

## 2023-07-11 VITALS
RESPIRATION RATE: 12 BRPM | BODY MASS INDEX: 24.14 KG/M2 | WEIGHT: 163 LBS | HEART RATE: 82 BPM | HEIGHT: 69 IN | OXYGEN SATURATION: 97 % | TEMPERATURE: 98.3 F | SYSTOLIC BLOOD PRESSURE: 98 MMHG | DIASTOLIC BLOOD PRESSURE: 65 MMHG

## 2023-07-11 DIAGNOSIS — F84.5 ASPERGER'S SYNDROME: Primary | ICD-10-CM

## 2023-07-11 DIAGNOSIS — Z86.59 H/O ATTENTION DEFICIT HYPERACTIVITY DISORDER: ICD-10-CM

## 2023-07-11 DIAGNOSIS — F70 MILD INTELLECTUAL DISABILITIES: ICD-10-CM

## 2023-07-11 PROCEDURE — 99213 OFFICE O/P EST LOW 20 MIN: CPT | Performed by: NURSE PRACTITIONER
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
## 2024-10-21 ENCOUNTER — HOSPITAL ENCOUNTER (EMERGENCY)
Facility: CLINIC | Age: 44
Discharge: HOME | End: 2024-10-21
Attending: EMERGENCY MEDICINE | Admitting: EMERGENCY MEDICINE
Payer: COMMERCIAL

## 2024-10-21 ENCOUNTER — APPOINTMENT (OUTPATIENT)
Dept: RADIOLOGY | Facility: CLINIC | Age: 44
End: 2024-10-21
Attending: EMERGENCY MEDICINE
Payer: COMMERCIAL

## 2024-10-21 ENCOUNTER — TELEPHONE (OUTPATIENT)
Dept: ORTHOPEDICS | Facility: CLINIC | Age: 44
End: 2024-10-21
Payer: COMMERCIAL

## 2024-10-21 VITALS
BODY MASS INDEX: 22.08 KG/M2 | SYSTOLIC BLOOD PRESSURE: 124 MMHG | HEIGHT: 72 IN | HEART RATE: 86 BPM | RESPIRATION RATE: 16 BRPM | TEMPERATURE: 97.4 F | OXYGEN SATURATION: 100 % | WEIGHT: 163 LBS | DIASTOLIC BLOOD PRESSURE: 76 MMHG

## 2024-10-21 DIAGNOSIS — S42.032A CLOSED DISPLACED FRACTURE OF ACROMIAL END OF LEFT CLAVICLE, INITIAL ENCOUNTER: ICD-10-CM

## 2024-10-21 PROCEDURE — 99284 EMERGENCY DEPT VISIT MOD MDM: CPT | Mod: 25

## 2024-10-21 PROCEDURE — 23500 CLTX CLAVICULAR FX W/O MNPJ: CPT | Mod: LT

## 2024-10-21 PROCEDURE — 73030 X-RAY EXAM OF SHOULDER: CPT | Mod: LT

## 2024-10-21 ASSESSMENT — COLUMBIA-SUICIDE SEVERITY RATING SCALE - C-SSRS
1. IN THE PAST MONTH, HAVE YOU WISHED YOU WERE DEAD OR WISHED YOU COULD GO TO SLEEP AND NOT WAKE UP?: NO
6. HAVE YOU EVER DONE ANYTHING, STARTED TO DO ANYTHING, OR PREPARED TO DO ANYTHING TO END YOUR LIFE?: NO
2. HAVE YOU ACTUALLY HAD ANY THOUGHTS OF KILLING YOURSELF IN THE PAST MONTH?: NO

## 2024-10-21 ASSESSMENT — ACTIVITIES OF DAILY LIVING (ADL): ADLS_ACUITY_SCORE: 35
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
## 2024-10-21 NOTE — TELEPHONE ENCOUNTER
Other: FYI scheduled for Closed displaced fracture of acromial end of left clavicle-referral, x-ray in Epic. Patient declined to schedule sooner appt due to distance. Currently scheduled on 10/24

## 2024-10-22 ENCOUNTER — PATIENT OUTREACH (OUTPATIENT)
Dept: FAMILY MEDICINE | Facility: CLINIC | Age: 44
End: 2024-10-22
Payer: COMMERCIAL

## 2024-10-22 NOTE — TELEPHONE ENCOUNTER
TCM outreach #1    Any nurse: please complete the post discharge assessment when pt calls back and help schedule a follow-up appointment with Kay Limon.    Toshia Weiss RN